# Patient Record
Sex: MALE | Race: BLACK OR AFRICAN AMERICAN | URBAN - METROPOLITAN AREA
[De-identification: names, ages, dates, MRNs, and addresses within clinical notes are randomized per-mention and may not be internally consistent; named-entity substitution may affect disease eponyms.]

---

## 2019-04-09 ENCOUNTER — EMERGENCY (EMERGENCY)
Facility: HOSPITAL | Age: 49
LOS: 1 days | Discharge: HOME | End: 2019-04-09
Admitting: EMERGENCY MEDICINE
Payer: COMMERCIAL

## 2019-04-09 VITALS
OXYGEN SATURATION: 99 % | DIASTOLIC BLOOD PRESSURE: 95 MMHG | SYSTOLIC BLOOD PRESSURE: 156 MMHG | WEIGHT: 210.1 LBS | TEMPERATURE: 98 F | RESPIRATION RATE: 18 BRPM | HEART RATE: 83 BPM
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
## 2019-04-09 DIAGNOSIS — S62.241A DISPLACED FRACTURE OF SHAFT OF FIRST METACARPAL BONE, RIGHT HAND, INITIAL ENCOUNTER FOR CLOSED FRACTURE: ICD-10-CM

## 2019-04-09 DIAGNOSIS — M79.644 PAIN IN RIGHT FINGER(S): ICD-10-CM
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
## 2019-04-09 PROCEDURE — 99284 EMERGENCY DEPT VISIT MOD MDM: CPT | Mod: 25

## 2019-04-09 PROCEDURE — 73130 X-RAY EXAM OF HAND: CPT | Mod: 26,RT

## 2019-04-09 PROCEDURE — 73130 X-RAY EXAM OF HAND: CPT | Mod: 26

## 2019-04-09 PROCEDURE — 99283 EMERGENCY DEPT VISIT LOW MDM: CPT

## 2019-04-09 PROCEDURE — 73130 X-RAY EXAM OF HAND: CPT

## 2019-04-09 NOTE — ED PROVIDER NOTE - CLINICAL SUMMARY MEDICAL DECISION MAKING FREE TEXT BOX
47 yo M with no pmh c/o fracture to R thumb 2 weeks ago while he was on vacation in [place]. Pt was doing push up and fell. Pt was seen and evaluated there and was given a thumb spica splint. PT just came home yesterday and wanted to get repeat xrays. Admits to occasional pain. Denies numbness, tingling, swelling. R hand in thumb spica splint, NV intact. Xray shows displaced mid shaft 1st metacarpal fracture. Spoke with Dr. Victoria, recommended to keep pt in splint, will f/u this week in her office. Will need surgery.

## 2019-04-09 NOTE — ED PROVIDER NOTE - OBJECTIVE STATEMENT
47 yo M with no pmh c/o fracture to R thumb 2 weeks ago while he was on vacation in [place]. Pt was doing push up and fell. Pt was seen and evaluated there and was given a thumb spica splint. PT just came home yesterday and wanted to get repeat xrays. Admits to occasional pain. Denies numbness, tingling, swelling.

## 2019-04-09 NOTE — ED PROVIDER NOTE - CARE PROVIDER_API CALL
Brunilda Victoria)  Plastic Surgery; Surgery  [address], [address]  [place] [address]  Phone: [phone]  Fax: [phone]  Follow Up Time: 1-3 Days

## 2019-04-09 NOTE — ED PROVIDER NOTE - NSFOLLOWUPINSTRUCTIONS_ED_ALL_ED_FT
Follow up with Dr. Victoria within 1 week. Call for appointment.       Fracture    A fracture is a break in one of your bones. This can occur from a variety of injuries, especially traumatic ones. Symptoms include pain, bruising, or swelling. Do not use the injured limb. If a fracture is in one of the bones below your waist, do not put weight on that limb unless instructed to do so by your healthcare provider. Crutches or a cane may have been provided. A splint or cast may have been applied by your health care provider. Make sure to keep it dry and follow up with an orthopedist as instructed.    SEEK IMMEDIATE MEDICAL CARE IF YOU HAVE ANY OF THE FOLLOWING SYMPTOMS: numbness, tingling, increasing pain, or weakness in any part of the injured limb.

## 2019-04-09 NOTE — ED ADULT NURSE NOTE - CHPI ED NUR SYMPTOMS NEG
no stiffness/no tingling/no abrasion/no difficulty bearing weight/no fever/no back pain/no bruising/no deformity/no numbness/no weakness

## 2019-04-09 NOTE — ED PROVIDER NOTE - DIAGNOSTIC INTERPRETATION
ER PA: Alison Taylor  hand xray INTERPRETATION:  displaced 1st metacarpal fracture no soft tissue swelling noted; normal bony alignment.

## 2019-04-09 NOTE — ED ADULT NURSE NOTE - NSIMPLEMENTINTERV_GEN_ALL_ED
Implemented All Universal Safety Interventions:  [place] to call system. Call bell, personal items and telephone within reach. Instruct patient to call for assistance. Room bathroom lighting operational. Non-slip footwear when patient is off stretcher. Physically safe environment: no spills, clutter or unnecessary equipment. Stretcher in lowest position, wheels locked, appropriate side rails in place.

## 2019-04-09 NOTE — ED PROVIDER NOTE - PHYSICAL EXAMINATION
CONSTITUTIONAL: Well-appearing; well-nourished; in no apparent distress.   HEAD: Normocephalic; atraumatic.   EYES: PERRL; EOM intact; conjunctiva and sclera clear  ENT: normal nose; no rhinorrhea; normal pharynx with no erythema or lesions.   NECK: Supple; non-tender;   CARDIOVASCULAR: Normal S1, S2; no murmurs, rubs, or gallops. Regular rate and rhythm.   RESPIRATORY: Breathing easily; breath sounds clear and equal bilaterally; no wheezes, rhonchi, or rales.  MSK: R hand in thumb spica splint, good cap refill   EXT: No cyanosis or edema; N/V intact  SKIN: Normal for age and race; warm; dry; good turgor; no apparent lesions or rash.

## 2019-04-09 NOTE — ED ADULT NURSE NOTE - OBJECTIVE STATEMENT
49 y/o male c/o hand fracture 2 weeks ago, currently R hand is in brace. pt requesting xray to make sure his hand is healing. 49 y/o male c/o R thumb fracture 2 weeks ago, currently R hand/thumb is in brace. pt requesting xray to make sure his thumb is healing.

## 2019-04-24 ENCOUNTER — OUTPATIENT (OUTPATIENT)
Dept: OUTPATIENT SERVICES | Facility: HOSPITAL | Age: 49
LOS: 1 days | Discharge: HOME | End: 2019-04-24

## 2019-05-06 ENCOUNTER — OUTPATIENT (OUTPATIENT)
Dept: OUTPATIENT SERVICES | Facility: HOSPITAL | Age: 49
LOS: 1 days | End: 2019-05-06
Payer: COMMERCIAL

## 2019-05-06 PROCEDURE — 73130 X-RAY EXAM OF HAND: CPT

## 2019-05-06 PROCEDURE — 73130 X-RAY EXAM OF HAND: CPT | Mod: 26,RT

## 2019-05-13 ENCOUNTER — OUTPATIENT (OUTPATIENT)
Dept: OUTPATIENT SERVICES | Facility: HOSPITAL | Age: 49
LOS: 1 days | End: 2019-05-13
Payer: COMMERCIAL

## 2019-05-13 PROCEDURE — 73130 X-RAY EXAM OF HAND: CPT | Mod: 26,RT

## 2019-05-13 PROCEDURE — 73130 X-RAY EXAM OF HAND: CPT

## 2025-03-27 NOTE — ED PROVIDER NOTE - HISTORY ATTESTATION, MLM
Have you fainted/passed out in the past? No     Any allergies to lidocaine? No?     Any allergies to Betadine/iodine? No    Any tattoos and/or metal in your left leg, hip or thigh? No    Have you ate anything today?  Yes    [email or web address]      I have reviewed and confirmed nurses' notes...